# Patient Record
Sex: FEMALE | Race: WHITE | Employment: UNEMPLOYED | URBAN - METROPOLITAN AREA
[De-identification: names, ages, dates, MRNs, and addresses within clinical notes are randomized per-mention and may not be internally consistent; named-entity substitution may affect disease eponyms.]

---

## 2019-02-22 ENCOUNTER — OFFICE VISIT (OUTPATIENT)
Dept: FAMILY MEDICINE CLINIC | Age: 29
End: 2019-02-22

## 2019-02-22 VITALS
BODY MASS INDEX: 23.49 KG/M2 | HEIGHT: 68 IN | WEIGHT: 155 LBS | TEMPERATURE: 97 F | SYSTOLIC BLOOD PRESSURE: 108 MMHG | HEART RATE: 81 BPM | DIASTOLIC BLOOD PRESSURE: 67 MMHG | RESPIRATION RATE: 18 BRPM

## 2019-02-22 DIAGNOSIS — Z02.89 ENCOUNTER FOR PHYSICAL EXAMINATION OF PROSPECTIVE FOSTER PARENT: Primary | ICD-10-CM

## 2019-02-22 DIAGNOSIS — Z11.1 SCREENING FOR TUBERCULOSIS: ICD-10-CM

## 2019-02-22 DIAGNOSIS — C43.9 MALIGNANT MELANOMA, UNSPECIFIED SITE (HCC): ICD-10-CM

## 2019-02-22 DIAGNOSIS — Z97.5 IUD (INTRAUTERINE DEVICE) IN PLACE: ICD-10-CM

## 2019-02-22 NOTE — PROGRESS NOTES
Chief Complaint Patient presents with [facility]  Muscle Pain  
  inner thigh in both legs off and on

## 2019-02-22 NOTE — LETTER
2/22/2019 8:51 AM 
 
Ms. Nay Adamson [address] [address] To Whom It May Concern: 
 
Nay Adamson is currently under the care of [address]. She does not appear to have any communicable disease and I believe she is fit to be a  medically. Her TB screening questionnaire was negative. If there are questions or concerns please have the patient contact our office.  
 
 
 
Sincerely, 
 
 
Iam Greenfield MD

## 2019-09-17 NOTE — PROGRESS NOTES
Family Medicine Initial Office Visit Patient: [facility] ANA 1990, 25 y.o., female Encounter Date: 2019 ASSESSMENT & PLAN 
  ICD-10-CM ICD-9-CM 1. Encounter for physical examination of prospective  Z02.89 V70.3 2. Screening for tuberculosis Z11.1 V74.1 3. Malignant melanoma, unspecified site ([facility] C43.9 172.9   
4. IUD (intrauterine device) in place Z97.5 V45.51 Orders Placed This Encounter  levonorgestrel (MIRENA) 20 mcg/24 hr (5 years) IUD Si Device by IntraUTERine route once. Well woman Anticipatory guidance Continue to follow with OBGYN Continue to follow with Dermatology TB screening questions negative No medical reason this patient would be unable to foster a child that is apparent on exam 
Discussed need for labs versus waiting, no significant recent changes, patient otherwise young and healthy, had labs in last pregnancy, ok to wait and just check if something changes. I discussed with the patient that I routinely do baseline labs at 26 or if there are acute changes in health status, patient agreeable Declines STI testing Encouraged on flu shot, declines today No need for mammo or cscope yet F/u annually or as needed Good luck with fostering! CHIEF COMPLAINT Chief Complaint Patient presents with [facility]  Muscle Pain  
  inner thigh in both legs off and on SUBJECTIVE [facility] ANA is a 25 y.o. female presenting today for establishing care. Has been seeing Dr Rebecca Rodriguez, [facility] for most care these past few years--has 2 babies. Patient works as a [employer] and is self-employed. Patient lives in a house with  and 2 kids. Patient was last seen by primary care years ago Patient last saw a dentist within the last year Patient last had eye exam about 2 years ago Exercise: \"I try to\"--walks on incline, takes dog for walk Diet / Weight:healthy diet, weight is stable recently Tobacco:no EtOH:sometimes, once a week a glass Illicit Substances: no2 Sexual Activity: yes one male patient Has a Mirena, put in last march Hx of melanoma, following with dermatology every 3 months Going to be fostering a child hopefully--will need tb screening. Patient believes that screening questionnaire would be fine and testing with blood or ppd may not be required. Denies known exposure to any communicable diseases that she is aware of. Reports overall feeling very healthy and that home is safe environment. 1. Have you experienced any of the following symptoms in the past year? 
 a.) A productive cough for more than 3 weeks? no 
 b.) Hemoptysis (coughing up blood)? no 
 c.) Unexplained weight loss?no 
 d.) Fever, Chills, or night sweats for no known reason? no 
 e.) Persistent shortness of breath? no 
 f.) Unexplained fatigue? no 
 g.) Chest Pain? no 
2. Have you had contact with anyone with active tuberculosis disease in the past year? no 
3. Do you have a medical condition, or are you taking medications, which suppress 
 your immune system? no 
 
 
Review of Systems Constitutional: Negative for chills and fever. HENT: Negative. Eyes: Negative for visual disturbance. Respiratory: Negative for shortness of breath. Cardiovascular: Negative for chest pain and leg swelling. Gastrointestinal: Negative for constipation, diarrhea, nausea and vomiting. Endocrine: Negative for polydipsia, polyphagia and polyuria. Genitourinary: Negative for difficulty urinating. Musculoskeletal: Negative for arthralgias and myalgias. Skin: Negative for rash. Neurological: Negative for seizures, syncope and headaches. Psychiatric/Behavioral: At Baseline, stable All other systems reviewed and are negative. OBJECTIVE Visit Vitals /67 Pulse 81 Temp 97 °F (36.1 °C) (Oral) Resp 18 Ht 5' 8\" (1.727 m) Wt 155 lb (70.3 kg) BMI 23.57 kg/m² Physical Exam  
 Constitutional: She is oriented to person, place, and time. She appears well-developed and well-nourished. No distress. NAD, Nontoxic, Appears Stated Age HENT:  
Head: Normocephalic and atraumatic. Mouth/Throat: Oropharynx is clear and moist.  
Clear rhinorrhea, boggy nasal mucosa Eyes: Conjunctivae and EOM are normal. Right eye exhibits no discharge. Left eye exhibits no discharge. No scleral icterus. Neck: Neck supple. No thyromegaly present. Cardiovascular: Normal rate, regular rhythm and normal heart sounds. Exam reveals no gallop and no friction rub. No murmur heard. Pulmonary/Chest: Effort normal and breath sounds normal. No stridor. No respiratory distress. She has no wheezes. She has no rales. Abdominal: Soft. Bowel sounds are normal. She exhibits no distension. There is no tenderness. Musculoskeletal: She exhibits no edema or tenderness. Lymphadenopathy:  
  She has no cervical adenopathy. Neurological: She is alert and oriented to person, place, and time. Grossly intact CN Skin: Skin is warm and dry. No rash noted. She is not diaphoretic. Psychiatric: She has a normal mood and affect. Her behavior is normal.  
Nursing note and vitals reviewed. No results found for any visits on 19. HISTORICAL Reviewed and updated today, and as noted below: 
 
Past Medical History:  
Diagnosis Date  Anxiety associated with depression  IBS (irritable bowel syndrome)  Melanoma ([facility]  PIH (pregnancy induced hypertension), antepartum 2013  Psychiatric problem History of anxiety, no meds Past Surgical History:  
Procedure Laterality Date  HX GYN    
   HX OTHER SURGICAL Colonoscopy  HX OTHER SURGICAL [place] teeth Family History Problem Relation Age of Onset  Hypertension Father  Hypertension Paternal Grandfather  Heart Disease Paternal Grandfather  Hypertension Paternal Uncle  Heart Disease Maternal Grandfather  Diabetes Maternal Grandfather Social History Tobacco Use Smoking Status Never Smoker Smokeless Tobacco Never Used Social History Socioeconomic History  Marital status:  Spouse name: Sundeep Jacobs  Number of children: Not on file  Years of education: Not on file  Highest education level: Not on file Occupational History  Occupation: LPN Employer: [employer] Tobacco Use  Smoking status: Never Smoker  Smokeless tobacco: Never Used Substance and Sexual Activity  Alcohol use: No  
  Comment: Rarely  Drug use: No  
 Sexual activity: Yes  
  Partners: Male Birth control/protection: Condom, IUD No Known Allergies No visits with results within 3 Month(s) from this visit. Latest known visit with results is:  
Office Visit on 06/11/2013 Component Date Value Ref Range Status  Color (UA POC) 06/11/2013 Yellow  (none) Final  
 Clarity (UA POC) 06/11/2013 Cloudy  (none) Final  
 Glucose (UA POC) 06/11/2013 Negative  (none) Final  
 Bilirubin (UA POC) 06/11/2013 Negative  (none) Final  
 Ketones (UA POC) 06/11/2013 Negative  (none) Final  
 Specific gravity (UA POC) 06/11/2013 1.015  1.001 - 1.035 Final  
 Blood (UA POC) 06/11/2013 3+  (none) Final  
 pH (UA POC) 06/11/2013 7.5  4.6 - 8.0 Final  
 Protein (UA POC) 06/11/2013 Trace  Negative mg/dL Final  
 Urobilinogen (UA POC) 06/11/2013 0.2 mg/dL  0.2 - 1 Final  
 Nitrites (UA POC) 06/11/2013 Negative  (none) Final  
 Leukocyte esterase (UA POC) 06/11/2013 1+  (none) Final  
 
 
 
Ravi Sifuentes MD 
[address] 02/22/19 8:37 AM 
 
Portions of this note may have been populated using smart dictation software and may have \"sounds-like\" errors present. done done